# Patient Record
Sex: MALE | Race: OTHER | HISPANIC OR LATINO | URBAN - METROPOLITAN AREA
[De-identification: names, ages, dates, MRNs, and addresses within clinical notes are randomized per-mention and may not be internally consistent; named-entity substitution may affect disease eponyms.]

---

## 2020-04-30 ENCOUNTER — EMERGENCY (EMERGENCY)
Facility: HOSPITAL | Age: 35
LOS: 1 days | Discharge: HOME | End: 2020-04-30
Attending: EMERGENCY MEDICINE
Payer: SELF-PAY

## 2020-04-30 VITALS
SYSTOLIC BLOOD PRESSURE: 116 MMHG | HEART RATE: 74 BPM | RESPIRATION RATE: 18 BRPM | DIASTOLIC BLOOD PRESSURE: 84 MMHG | OXYGEN SATURATION: 96 %

## 2020-04-30 VITALS
TEMPERATURE: 98 F | HEART RATE: 66 BPM | SYSTOLIC BLOOD PRESSURE: 112 MMHG | WEIGHT: 184.97 LBS | OXYGEN SATURATION: 96 % | DIASTOLIC BLOOD PRESSURE: 62 MMHG | HEIGHT: 71 IN | RESPIRATION RATE: 20 BRPM

## 2020-04-30 PROCEDURE — 99283 EMERGENCY DEPT VISIT LOW MDM: CPT

## 2020-04-30 PROCEDURE — 99053 MED SERV 10PM-8AM 24 HR FAC: CPT

## 2020-04-30 RX ORDER — OXYCODONE AND ACETAMINOPHEN 5; 325 MG/1; MG/1
1 TABLET ORAL ONCE
Refills: 0 | Status: DISCONTINUED | OUTPATIENT
Start: 2020-04-30 | End: 2020-04-30

## 2020-04-30 RX ADMIN — OXYCODONE AND ACETAMINOPHEN 1 TABLET(S): 5; 325 TABLET ORAL at 02:05

## 2020-04-30 NOTE — ED PROVIDER NOTE - NSFOLLOWUPINSTRUCTIONS_ED_ALL_ED_FT
Rest, drink plenty of fluids.  Advance activity as tolerated.  Continue all previously prescribed medications as directed.  Follow up with your primary care physician in 48-72 hours- bring copies of your results.  Return to the ER for worsening or persistent symptoms, and/or ANY NEW OR CONCERNING SYMPTOMS. If you have issues obtaining follow up, please call: [phone] to obtain a doctor or specialist who takes your insurance in your area.

## 2020-04-30 NOTE — ED ADULT NURSE NOTE - OBJECTIVE STATEMENT
34y Male A&Ox3 came to ED c/o dental pain/injury.  No PMH or PSH. Pt states he had abscess drained 4 days ago, Pt states pain has continued to worsen, was prescribed Abx, doesn't know medication but is finishing them, Pt started to feel chills yesterday.  Pt presents with R sided jaw pain, non-radiating. Denies chest pain, sob, ha, n/v/d, abdominal pain, fevers, urinary symptoms, hematuria. Upon examination, full facial symmetry, no JVD or trach deviation, lung sounds clear and adequate chest rise, S1 and S2 heart sounds present, +2 pulses in all extremities with full ROM and equal strength, cap refill <2 seconds, ABD soft, non distended and non tender, pelvis intact.

## 2020-04-30 NOTE — ED PROVIDER NOTE - PATIENT PORTAL LINK FT
You can access the FollowMyHealth Patient Portal offered by [facility] by registering at the following website: [email or web address]. By joining [facility]’s FollowMyHealth portal, you will also be able to view your health information using other applications (apps) compatible with our system.

## 2020-04-30 NOTE — ED PROVIDER NOTE - PHYSICAL EXAMINATION
Sushma Hutchison, .:   GENERAL: Patient awake alert NAD.  HEENT: NC/AT, Moist mucous membranes, PERRL, EOMI. +small induration on upper right buccal region  LUNGS: CTAB, no wheezes or crackles.   CARDIAC: RRR, no m/r/g.    ABDOMEN: Soft, NT, ND, No rebound, guarding. No CVA tenderness.   EXT: No edema. No calf tenderness.   MSK: No spinal tenderness, no pain with movement, no deformities.  NEURO: A&Ox3. Moving all extremities.  SKIN: Warm and dry. No rash.  PSYCH: Normal affect.

## 2020-04-30 NOTE — ED PROVIDER NOTE - ATTENDING CONTRIBUTION TO CARE
No emergent process. no large dental abscess, pt has f/u later today with [facility] per his report. stable for d/c.

## 2020-04-30 NOTE — ED PROVIDER NOTE - CLINICAL SUMMARY MEDICAL DECISION MAKING FREE TEXT BOX
34M p/w dental pain, small abscess possible, no fevers, headache. On antibiotics. Will give one dose of pain medicine and dc home.

## 2020-04-30 NOTE — ED PROVIDER NOTE - NS ED ROS FT
CONST: no fevers, no chills  EYES: no pain, no vision changes  ENT: no sore throat, no ear pain, no change in hearing, +small induration on upper right buccal region   CV: no chest pain, no leg swelling  RESP: no shortness of breath, no cough  ABD: no abdominal pain, no nausea, no vomiting, no diarrhea  : no dysuria, no flank pain, no hematuria  MSK: no back pain, no extremity pain  NEURO: no headache or additional neurologic complaints  HEME: no easy bleeding  SKIN:  no rash CONST: no fevers, no chills  EYES: no pain, no vision changes  ENT: no sore throat, no ear pain, no change in hearing, +dental pain   CV: no chest pain, no leg swelling  RESP: no shortness of breath, no cough  ABD: no abdominal pain, no nausea, no vomiting, no diarrhea  : no dysuria, no flank pain, no hematuria  MSK: no back pain, no extremity pain  NEURO: no headache or additional neurologic complaints  HEME: no easy bleeding  SKIN:  no rash

## 2020-04-30 NOTE — ED PROVIDER NOTE - OBJECTIVE STATEMENT
34M no known pmhx presents with worsening right upper tooth pain, on an antibiotic, unknown. Has appointment at [facility] tomorrow for likely drainage of abscess. Would like pain medication. No fevers, N/V/D, headache, vision changes. No recent dental procedures.

## 2024-11-25 NOTE — ED ADULT NURSE NOTE - CINV DISCH MEDS REVIEWED YN
Last Office Visit: 11/5/2024 Provider: ADDISON  Is provider OOT? No    Next Office Visit: 5/5/2025 Provider: ADDISON      LAST LABS:   CBC:   Lab Results   Component Value Date    WBC 10.0 01/27/2024    HGB 9.3 (L) 01/27/2024    HCT 27.4 (L) 01/27/2024    MCV 90.3 01/27/2024     01/27/2024     CMP:    Lab Results   Component Value Date     (L) 01/27/2024    K 4.4 01/27/2024    CL 95 (L) 01/27/2024    CO2 24 01/27/2024    BUN 45 (H) 01/27/2024    CREATININE 1.4 (H) 01/27/2024    GLUCOSE 258 (H) 01/27/2024    CALCIUM 9.3 01/27/2024    BILITOT 0.3 01/05/2024    ALKPHOS 24 (L) 01/05/2024    AST 32 01/05/2024    ALT 21 01/05/2024    LABGLOM 56 (A) 01/27/2024    GFRAA 50 (A) 06/02/2022    AGRATIO 1.5 09/13/2023    GLOB 3.2 09/13/2021          Liver:   Lab Results   Component Value Date    ALT 21 01/05/2024    AST 32 01/05/2024    ALKPHOS 24 (L) 01/05/2024    BILITOT 0.3 01/05/2024          Is encounter provider correct?   Yes and No  Does refill dosage match last filled?   Yes  Changes to script from Tele Encounters or LOV Plan?   no  Did you adjust dispensed amount or refills to reflect last and upcoming OV?  Yes    Requested Prescriptions     Pending Prescriptions Disp Refills    omega-3 acid ethyl esters (LOVAZA) 1 g capsule [Pharmacy Med Name: OMEGA-3 ETHYL ESTERS 1 GM CAP] 150 capsule 5     Sig: TAKE 1 CAPSULE BY MOUTH FIVE TIMES A DAY         n/a